# Patient Record
Sex: FEMALE | Race: WHITE | NOT HISPANIC OR LATINO | Employment: FULL TIME | URBAN - METROPOLITAN AREA
[De-identification: names, ages, dates, MRNs, and addresses within clinical notes are randomized per-mention and may not be internally consistent; named-entity substitution may affect disease eponyms.]

---

## 2019-06-11 LAB
CHOLEST SERPL-MCNC: 235 MG/DL
CHOLESTEROL (EXTERNAL): 235 MG/DL (ref 0–199)
GLUCOSE SERPL-MCNC: 97 MG/DL (ref 70–100)
HDLC SERPL-MCNC: 85 MG/DL
HDLC SERPL-MCNC: 85 MG/DL
LDL CHOLESTEROL CALCULATED (EXTERNAL): 131 MG/DL
LDLC SERPL CALC-MCNC: 131 MG/DL
NON HDL CHOLESTEROL (EXTERNAL): 150 MG/DL
NONHDLC SERPL-MCNC: NORMAL MG/DL
TRIGL SERPL-MCNC: 93 MG/DL
TRIGLYCERIDES (EXTERNAL): 93 MG/DL
TSH SERPL-ACNC: 2.5 MCU/ML

## 2019-06-28 DIAGNOSIS — I34.1 MVP (MITRAL VALVE PROLAPSE): Primary | ICD-10-CM

## 2019-06-28 DIAGNOSIS — I05.9 MITRAL VALVE DISEASE: ICD-10-CM

## 2019-08-06 ENCOUNTER — HOSPITAL ENCOUNTER (OUTPATIENT)
Dept: CARDIOLOGY | Facility: CLINIC | Age: 64
Discharge: HOME | End: 2019-08-06
Attending: INTERNAL MEDICINE | Admitting: INTERNAL MEDICINE
Payer: COMMERCIAL

## 2019-08-06 DIAGNOSIS — I05.9 MITRAL VALVE DISEASE: ICD-10-CM

## 2019-08-06 DIAGNOSIS — I34.1 MVP (MITRAL VALVE PROLAPSE): ICD-10-CM

## 2019-08-06 PROCEDURE — 93306 TTE W/DOPPLER COMPLETE: CPT | Mod: 26 | Performed by: INTERNAL MEDICINE

## 2019-08-06 PROCEDURE — 93306 TTE W/DOPPLER COMPLETE: CPT

## 2019-10-31 ENCOUNTER — PRE VISIT (OUTPATIENT)
Dept: CARDIOLOGY | Facility: CLINIC | Age: 64
End: 2019-10-31

## 2019-11-22 ENCOUNTER — OFFICE VISIT (OUTPATIENT)
Dept: CARDIOLOGY | Facility: CLINIC | Age: 64
End: 2019-11-22
Payer: COMMERCIAL

## 2019-11-22 VITALS
SYSTOLIC BLOOD PRESSURE: 123 MMHG | HEIGHT: 64 IN | WEIGHT: 112 LBS | HEART RATE: 80 BPM | BODY MASS INDEX: 19.12 KG/M2 | DIASTOLIC BLOOD PRESSURE: 62 MMHG

## 2019-11-22 DIAGNOSIS — E78.00 PURE HYPERCHOLESTEROLEMIA: ICD-10-CM

## 2019-11-22 DIAGNOSIS — I05.9 MITRAL VALVE DISEASE: ICD-10-CM

## 2019-11-22 DIAGNOSIS — I34.1 MVP (MITRAL VALVE PROLAPSE): Primary | ICD-10-CM

## 2019-11-22 PROCEDURE — 99213 OFFICE O/P EST LOW 20 MIN: CPT | Performed by: INTERNAL MEDICINE

## 2019-11-22 RX ORDER — CHOLECALCIFEROL (VITAMIN D3) 50 MCG
1 TABLET ORAL DAILY
COMMUNITY

## 2019-11-22 ASSESSMENT — MIFFLIN-ST. JEOR: SCORE: 1048.03

## 2019-11-22 NOTE — LETTER
11/22/2019    Kiana Byrd  [facility] [address] [place] [address]    RE: Charu Murillo       Dear Colleague,    I had the pleasure of seeing Charu Murillo in the [facility].    HPI and Plan:   See dictation    Orders Placed This Encounter   Procedures     Follow-Up with Cardiologist       Orders Placed This Encounter   Medications     Multiple Vitamins-Minerals (WOMENS MULTI PO)     Sig: Take by mouth daily     vitamin D3 (CHOLECALCIFEROL) 2000 units (50 mcg) tablet     Sig: Take 1 tablet by mouth daily       There are no discontinued medications.      Encounter Diagnoses   Name Primary?     MVP (mitral valve prolapse) Yes     Mitral valve disease      Pure hypercholesterolemia        CURRENT MEDICATIONS:  Current Outpatient Medications   Medication Sig Dispense Refill     Multiple Vitamins-Minerals (WOMENS MULTI PO) Take by mouth daily       traZODone (DESYREL) 50 MG tablet Take 50 mg by mouth At Bedtime       vitamin D3 (CHOLECALCIFEROL) 2000 units (50 mcg) tablet Take 1 tablet by mouth daily         ALLERGIES     Allergies   Allergen Reactions     Penicillins Anaphylaxis       PAST MEDICAL HISTORY:  Past Medical History:   Diagnosis Date     Family history of coronary artery disease      GERD (gastroesophageal reflux disease)      Hyperlipidemia      Insomnia      MVP (mitral valve prolapse)      Palpitations      Thyroid cyst        PAST SURGICAL HISTORY:  History reviewed. No pertinent surgical history.    FAMILY HISTORY:  Family History   Problem Relation Age of Onset     Myocardial Infarction Father 62        Massive @ 67       SOCIAL HISTORY:  Social History     Socioeconomic History     Marital status:      Spouse name: None     Number of children: None     Years of education: None     Highest education level: None   Occupational History     None   Social Needs     Financial resource strain: None     Food insecurity:     Worry: None      Inability: None     Transportation needs:     Medical: None     Non-medical: None   Tobacco Use     Smoking status: Never Smoker     Smokeless tobacco: Never Used   Substance and Sexual Activity     Alcohol use: Yes     Alcohol/week: 0.0 standard drinks     Comment: Wine      Drug use: None     Sexual activity: None   Lifestyle     Physical activity:     Days per week: None     Minutes per session: None     Stress: None   Relationships     Social connections:     Talks on phone: None     Gets together: None     Attends Sabianism service: None     Active member of club or organization: None     Attends meetings of clubs or organizations: None     Relationship status: None     Intimate partner violence:     Fear of current or ex partner: None     Emotionally abused: None     Physically abused: None     Forced sexual activity: None   Other Topics Concern     Parent/sibling w/ CABG, MI or angioplasty before 65F 55M? No      Service Not Asked     Blood Transfusions Not Asked     Caffeine Concern Not Asked     Occupational Exposure Not Asked     Hobby Hazards Not Asked     Sleep Concern Not Asked     Stress Concern Not Asked     Weight Concern Not Asked     Special Diet No     Back Care Not Asked     Exercise Yes     Comment: walking, biking, running, boot camp      Bike Helmet Not Asked     Seat Belt Not Asked     Self-Exams Not Asked   Social History Narrative     None       Review of Systems:  Skin:  Positive for   hx of skin cancer    Eyes:  Positive for glasses    ENT:  Positive for vertigo No recent spells  Respiratory:  Negative       Cardiovascular:    Positive for;palpitations rare  Gastroenterology: Negative      Genitourinary:  Negative urinary frequency    Musculoskeletal:  Positive for(new DX os osteoporosis)   herniated disk, left knee torn meniscus   Neurologic:  Negative      Psychiatric:  Positive for sleep disturbances    Heme/Lymph/Imm:  Positive for allergies    Endocrine:  Negative     "    Physical Exam:  Vitals: /62   Pulse 80   Ht 1.626 m (5' 4\")   Wt 50.8 kg (112 lb)   BMI 19.22 kg/m       Constitutional:  cooperative, alert and oriented, well developed, well nourished, in no acute distress thin      Skin:  warm and dry to the touch, no apparent skin lesions or masses noted          Head:  normocephalic, no masses or lesions        Eyes:  pupils equal and round, conjunctivae and lids unremarkable, sclera white, no xanthalasma, EOMS intact, no nystagmus        Lymph:      ENT:  no pallor or cyanosis, dentition good        Neck:  carotid pulses are full and equal bilaterally;no carotid bruit        Respiratory:  normal breath sounds, clear to auscultation, normal A-P diameter, normal symmetry, normal respiratory excursion, no use of accessory muscles         Cardiac: regular rhythm;normal S1 and S2;no S3 or S4       holosystolic murmur;apical;grade 2;radiation to the axilla        pulses full and equal                                        GI:           Extremities and Muscular Skeletal:  no edema;no spinal abnormalities noted;normal muscle strength and tone              Neurological:  no gross motor deficits        Psych:  affect appropriate, oriented to time, person and place        CC  Provider Not In System                   Thank you for allowing me to participate in the care of your patient.      Sincerely,     Yvon Alexandre MD     [facility]    cc:   Provider Not In System           "

## 2019-11-22 NOTE — PROGRESS NOTES
Service Date: 11/22/2019      HISTORY OF PRESENT ILLNESS:  Ms. Murillo is a very nice 64-year-old woman with a past medical history significant for mitral valve prolapse, mild mitral regurgitation, palpitations, marked hypercholesterolemia with a very high HDL and a family history of coronary artery disease.  Fortunately, her calcium score was 0 in September of this year.      Charu returns to clinic stating she is feeling great.  She has no chest, arm, neck, jaw or shoulder discomfort.  No dyspnea on exertion, orthopnea or PND.  No palpitations, lightheadedness, dizziness, syncope or near-syncope.      Charu states she exercises probably about 4 times a week doing a combination of aerobic activity and resistance activity.  She tries to follow a healthy diet and maintains a body mass index of 19.      Her concern at this time is she has been told that she has osteoporosis and it has been recommended that she go on medications, and she is quite concerned about this.  She likes to try to do everything the natural way.  She does take a vitamin D supplement and asks about drinking milk.      ASSESSMENT AND PLAN:  Charu appears to be doing quite well from a cardiac standpoint without clinical evidence of ischemia, heart failure or significant arrhythmia.      We reviewed her calcium score of 0 and the fact that this is as low risk as she can be.      Blood pressure is very well controlled at 123/62 with a pulse of 80.  As stated, her weight is 112 pounds with clothes and boots on giving her a body mass index of 19, so it is obviously even lower than that.  Fasting lipid profile was checked earlier this year in June.  Total cholesterol is 235, HDL is 85, LDL is 131, triglycerides are 93.  We talked about the parameters, and obviously her HDL is quite high, LDL and cholesterol are a bit high for someone without known coronary artery disease, but yet I would not start aspirin or I would not start cholesterol.      We did  "do a followup echocardiogram.  Last echo was 3 years ago.  Echo today describes only trace mitral regurgitation and mild tricuspid regurgitation.  She does have mild mitral valve prolapse.  We have been following this every 3 years.  I told her I do not think this is even necessary, that she is not likely to progress, although she states she is anxious and would like to make sure we keep an eye on it, so ultimately we decided we will just check it again in 3 years and we will visit again.      I congratulated her on her healthy lifestyle.      I have told her my limited information on bone supplements or medications for bone metabolism are somewhat gray, that they increase calcium deposition but may not necessarily decrease bone fractures.  I told her the most important thing is regular exercise including resistance activity, which she is doing, a diet high in natural sources of calcium and taking a vitamin D supplement.  I have told her she should discuss this further with her primary care doctor.  I have also suggested that she read Evelia's book, \"How Not to Die,\" especially since she is also concerned about a diet to prevent lung cancer, which does run in her family, and I referred her to look on the Internet at reliable sources.      She asks about my wife, who is a holistic health , and I gave her a card if she wants a review of lifestyle changes, although it sounds like she is doing everything appropriately as we speak.  As stated, I will follow her up in 3 years.  If she should have any problems, I would be glad to see her sooner.         ERI BATRES MD, [facility]             D: 2019   T: 2019   MT: ALESIA      Name:     RODGER LOZANO   MRN:      [id number]        Account:      [id number]   :      1955           Service Date: 2019      Document: [id number]    "

## 2019-11-22 NOTE — PROGRESS NOTES
HPI and Plan:   See dictation    Orders Placed This Encounter   Procedures     Follow-Up with Cardiologist       Orders Placed This Encounter   Medications     Multiple Vitamins-Minerals (WOMENS MULTI PO)     Sig: Take by mouth daily     vitamin D3 (CHOLECALCIFEROL) 2000 units (50 mcg) tablet     Sig: Take 1 tablet by mouth daily       There are no discontinued medications.      Encounter Diagnoses   Name Primary?     MVP (mitral valve prolapse) Yes     Mitral valve disease      Pure hypercholesterolemia        CURRENT MEDICATIONS:  Current Outpatient Medications   Medication Sig Dispense Refill     Multiple Vitamins-Minerals (WOMENS MULTI PO) Take by mouth daily       traZODone (DESYREL) 50 MG tablet Take 50 mg by mouth At Bedtime       vitamin D3 (CHOLECALCIFEROL) 2000 units (50 mcg) tablet Take 1 tablet by mouth daily         ALLERGIES     Allergies   Allergen Reactions     Penicillins Anaphylaxis       PAST MEDICAL HISTORY:  Past Medical History:   Diagnosis Date     Family history of coronary artery disease      GERD (gastroesophageal reflux disease)      Hyperlipidemia      Insomnia      MVP (mitral valve prolapse)      Palpitations      Thyroid cyst        PAST SURGICAL HISTORY:  History reviewed. No pertinent surgical history.    FAMILY HISTORY:  Family History   Problem Relation Age of Onset     Myocardial Infarction Father 62        Massive @ 67       SOCIAL HISTORY:  Social History     Socioeconomic History     Marital status:      Spouse name: None     Number of children: None     Years of education: None     Highest education level: None   Occupational History     None   Social Needs     Financial resource strain: None     Food insecurity:     Worry: None     Inability: None     Transportation needs:     Medical: None     Non-medical: None   Tobacco Use     Smoking status: Never Smoker     Smokeless tobacco: Never Used   Substance and Sexual Activity     Alcohol use: Yes     Alcohol/week: 0.0  "standard drinks     Comment: Wine      Drug use: None     Sexual activity: None   Lifestyle     Physical activity:     Days per week: None     Minutes per session: None     Stress: None   Relationships     Social connections:     Talks on phone: None     Gets together: None     Attends [employer] service: None     Active member of club or organization: None     Attends meetings of clubs or organizations: None     Relationship status: None     Intimate partner violence:     Fear of current or ex partner: None     Emotionally abused: None     Physically abused: None     Forced sexual activity: None   Other Topics Concern     Parent/sibling w/ CABG, MI or angioplasty before 65F 55M? No      Service Not Asked     Blood Transfusions Not Asked     Caffeine Concern Not Asked     Occupational Exposure Not Asked     Hobby Hazards Not Asked     Sleep Concern Not Asked     Stress Concern Not Asked     Weight Concern Not Asked     Special Diet No     Back Care Not Asked     Exercise Yes     Comment: walking, biking, running, boot camp      Bike Helmet Not Asked     Seat Belt Not Asked     Self-Exams Not Asked   Social History Narrative     None       Review of Systems:  Skin:  Positive for   hx of skin cancer    Eyes:  Positive for glasses    ENT:  Positive for vertigo No recent spells  Respiratory:  Negative       Cardiovascular:    Positive for;palpitations rare  Gastroenterology: Negative      Genitourinary:  Negative urinary frequency    Musculoskeletal:  Positive for(new DX os osteoporosis)   herniated disk, left knee torn meniscus   Neurologic:  Negative      Psychiatric:  Positive for sleep disturbances    Heme/Lymph/Imm:  Positive for allergies    Endocrine:  Negative        Physical Exam:  Vitals: /62   Pulse 80   Ht 1.626 m (5' 4\")   Wt 50.8 kg (112 lb)   BMI 19.22 kg/m      Constitutional:  cooperative, alert and oriented, well developed, well nourished, in no acute distress thin      Skin:  warm and " dry to the touch, no apparent skin lesions or masses noted          Head:  normocephalic, no masses or lesions        Eyes:  pupils equal and round, conjunctivae and lids unremarkable, sclera white, no xanthalasma, EOMS intact, no nystagmus        Lymph:      ENT:  no pallor or cyanosis, dentition good        Neck:  carotid pulses are full and equal bilaterally;no carotid bruit        Respiratory:  normal breath sounds, clear to auscultation, normal A-P diameter, normal symmetry, normal respiratory excursion, no use of accessory muscles         Cardiac: regular rhythm;normal S1 and S2;no S3 or S4       holosystolic murmur;apical;grade 2;radiation to the axilla        pulses full and equal                                        GI:           Extremities and Muscular Skeletal:  no edema;no spinal abnormalities noted;normal muscle strength and tone              Neurological:  no gross motor deficits        Psych:  affect appropriate, oriented to time, person and place        CC  Provider Not In System

## 2019-11-22 NOTE — LETTER
11/22/2019      Kiana Byrd  [facility] [address] [place] [address]      RE: Charu Murillo       Dear Colleague,    I had the pleasure of seeing Charu Murillo in the [facility].    Service Date: 11/22/2019      HISTORY OF PRESENT ILLNESS:  Ms. Murillo is a very nice 64-year-old woman with a past medical history significant for mitral valve prolapse, mild mitral regurgitation, palpitations, marked hypercholesterolemia with a very high HDL and a family history of coronary artery disease.  Fortunately, her calcium score was 0 in September of this year.      Charu returns to clinic stating she is feeling great.  She has no chest, arm, neck, jaw or shoulder discomfort.  No dyspnea on exertion, orthopnea or PND.  No palpitations, lightheadedness, dizziness, syncope or near-syncope.      Charu states she exercises probably about 4 times a week doing a combination of aerobic activity and resistance activity.  She tries to follow a healthy diet and maintains a body mass index of 19.      Her concern at this time is she has been told that she has osteoporosis and it has been recommended that she go on medications, and she is quite concerned about this.  She likes to try to do everything the natural way.  She does take a vitamin D supplement and asks about drinking milk.      ASSESSMENT AND PLAN:  Charu appears to be doing quite well from a cardiac standpoint without clinical evidence of ischemia, heart failure or significant arrhythmia.      We reviewed her calcium score of 0 and the fact that this is as low risk as she can be.      Blood pressure is very well controlled at 123/62 with a pulse of 80.  As stated, her weight is 112 pounds with clothes and boots on giving her a body mass index of 19, so it is obviously even lower than that.  Fasting lipid profile was checked earlier this year in June.  Total cholesterol is 235, HDL is 85, LDL is 131,  "triglycerides are 93.  We talked about the parameters, and obviously her HDL is quite high, LDL and cholesterol are a bit high for someone without known coronary artery disease, but yet I would not start aspirin or I would not start cholesterol.      We did do a followup echocardiogram.  Last echo was 3 years ago.  Echo today describes only trace mitral regurgitation and mild tricuspid regurgitation.  She does have mild mitral valve prolapse.  We have been following this every 3 years.  I told her I do not think this is even necessary, that she is not likely to progress, although she states she is anxious and would like to make sure we keep an eye on it, so ultimately we decided we will just check it again in 3 years and we will visit again.      I congratulated her on her healthy lifestyle.      I have told her my limited information on bone supplements or medications for bone metabolism are somewhat gray, that they increase calcium deposition but may not necessarily decrease bone fractures.  I told her the most important thing is regular exercise including resistance activity, which she is doing, a diet high in natural sources of calcium and taking a vitamin D supplement.  I have told her she should discuss this further with her primary care doctor.  I have also suggested that she read Evelia's book, \"How Not to Die,\" especially since she is also concerned about a diet to prevent lung cancer, which does run in her family, and I referred her to look on the Internet at reliable sources.      She asks about my wife, who is a holistic health , and I gave her a card if she wants a review of lifestyle changes, although it sounds like she is doing everything appropriately as we speak.  As stated, I will follow her up in 3 years.  If she should have any problems, I would be glad to see her sooner.         ERI BATRES MD, FACC             D: 11/22/2019   T: 11/22/2019   MT: ALESIA      Name:     RODGER LOZANO "   MRN:      [id number]        Account:      [id number]   :      1955           Service Date: 2019      Document: [id number]         Outpatient Encounter Medications as of 2019   Medication Sig Dispense Refill     Multiple Vitamins-Minerals (WOMENS MULTI PO) Take by mouth daily       traZODone (DESYREL) 50 MG tablet Take 50 mg by mouth At Bedtime       vitamin D3 (CHOLECALCIFEROL) 2000 units (50 mcg) tablet Take 1 tablet by mouth daily       No facility-administered encounter medications on file as of 2019.        Again, thank you for allowing me to participate in the care of your patient.      Sincerely,    Yvon Alexandre MD     [facility]

## 2020-03-01 ENCOUNTER — HEALTH MAINTENANCE LETTER (OUTPATIENT)
Age: 65
End: 2020-03-01

## 2020-08-13 LAB
CHOLESTEROL (EXTERNAL): 251 MG/DL (ref 0–199)
HDLC SERPL-MCNC: 102 MG/DL
LDL CHOLESTEROL CALCULATED (EXTERNAL): 137 MG/DL
NON HDL CHOLESTEROL (EXTERNAL): 149 MG/DL
TRIGLYCERIDES (EXTERNAL): 62 MG/DL

## 2020-12-13 ENCOUNTER — HEALTH MAINTENANCE LETTER (OUTPATIENT)
Age: 65
End: 2020-12-13

## 2021-04-17 ENCOUNTER — HEALTH MAINTENANCE LETTER (OUTPATIENT)
Age: 66
End: 2021-04-17

## 2021-10-02 ENCOUNTER — HEALTH MAINTENANCE LETTER (OUTPATIENT)
Age: 66
End: 2021-10-02

## 2021-11-27 ENCOUNTER — HEALTH MAINTENANCE LETTER (OUTPATIENT)
Age: 66
End: 2021-11-27

## 2022-01-16 ENCOUNTER — HEALTH MAINTENANCE LETTER (OUTPATIENT)
Age: 67
End: 2022-01-16

## 2022-08-17 DIAGNOSIS — I34.1 MVP (MITRAL VALVE PROLAPSE): Primary | ICD-10-CM

## 2022-08-17 DIAGNOSIS — I34.1 MVP (MITRAL VALVE PROLAPSE): ICD-10-CM

## 2022-09-14 ENCOUNTER — HOSPITAL ENCOUNTER (OUTPATIENT)
Dept: CARDIOLOGY | Facility: CLINIC | Age: 67
Discharge: HOME | End: 2022-09-14
Attending: INTERNAL MEDICINE | Admitting: INTERNAL MEDICINE
Payer: MEDICARE

## 2022-09-14 DIAGNOSIS — I05.9 MITRAL VALVE DISEASE: ICD-10-CM

## 2022-09-14 LAB — LVEF ECHO: NORMAL

## 2022-09-14 PROCEDURE — 93306 TTE W/DOPPLER COMPLETE: CPT | Mod: 26 | Performed by: INTERNAL MEDICINE

## 2022-09-14 PROCEDURE — 93306 TTE W/DOPPLER COMPLETE: CPT

## 2022-09-23 ENCOUNTER — TRANSFERRED RECORDS (OUTPATIENT)
Dept: HEALTH INFORMATION MANAGEMENT | Facility: CLINIC | Age: 67
End: 2022-09-23

## 2022-09-23 LAB
CHOLESTEROL (EXTERNAL): 263 MG/DL (ref 0–199)
GLUCOSE (EXTERNAL): 85 MG/DL (ref 70–100)
HDLC SERPL-MCNC: 93 MG/DL
LDL CHOLESTEROL CALCULATED (EXTERNAL): 157 MG/DL
NON HDL CHOLESTEROL (EXTERNAL): 170 MG/DL
TRIGLYCERIDES (EXTERNAL): 65 MG/DL

## 2023-01-10 ENCOUNTER — OFFICE VISIT (OUTPATIENT)
Dept: CARDIOLOGY | Facility: CLINIC | Age: 68
End: 2023-01-10
Payer: MEDICARE

## 2023-01-10 VITALS
WEIGHT: 105 LBS | DIASTOLIC BLOOD PRESSURE: 80 MMHG | HEART RATE: 83 BPM | HEIGHT: 63 IN | SYSTOLIC BLOOD PRESSURE: 137 MMHG | BODY MASS INDEX: 18.61 KG/M2

## 2023-01-10 DIAGNOSIS — Z82.49 FAMILY HISTORY OF CORONARY ARTERY DISEASE: ICD-10-CM

## 2023-01-10 DIAGNOSIS — I34.0 NONRHEUMATIC MITRAL VALVE REGURGITATION: ICD-10-CM

## 2023-01-10 DIAGNOSIS — I34.1 MVP (MITRAL VALVE PROLAPSE): Primary | ICD-10-CM

## 2023-01-10 DIAGNOSIS — E78.00 PURE HYPERCHOLESTEROLEMIA: ICD-10-CM

## 2023-01-10 PROCEDURE — 99203 OFFICE O/P NEW LOW 30 MIN: CPT | Performed by: INTERNAL MEDICINE

## 2023-01-10 NOTE — PROGRESS NOTES
HPI and Plan:   See dictation    Today's clinic visit entailed:  Review of the result(s) of each unique test - Echocardiogram, lab work  Ordering of each unique test  Prescription drug management  30 minutes spent on the date of the encounter doing chart review, history and exam, documentation and further activities per the note  Provider  Link to [facility] Help Grid           Orders Placed This Encounter   Procedures     Follow-Up with Cardiologist       No orders of the defined types were placed in this encounter.      There are no discontinued medications.      Encounter Diagnoses   Name Primary?     MVP (mitral valve prolapse) Yes     Nonrheumatic mitral valve regurgitation      Pure hypercholesterolemia      Family history of coronary artery disease        CURRENT MEDICATIONS:  Current Outpatient Medications   Medication Sig Dispense Refill     Multiple Vitamins-Minerals (WOMENS MULTI PO) Take by mouth daily       traZODone (DESYREL) 50 MG tablet Take 50 mg by mouth At Bedtime       vitamin D3 (CHOLECALCIFEROL) 2000 units (50 mcg) tablet Take 1 tablet by mouth daily         ALLERGIES     Allergies   Allergen Reactions     Penicillins Anaphylaxis       PAST MEDICAL HISTORY:  Past Medical History:   Diagnosis Date     Family history of coronary artery disease      GERD (gastroesophageal reflux disease)      Hyperlipidemia      Insomnia      MVP (mitral valve prolapse)      Palpitations      Thyroid cyst        PAST SURGICAL HISTORY:  No past surgical history on file.    FAMILY HISTORY:  Family History   Problem Relation Age of Onset     Myocardial Infarction Father 62        Massive @ 67       SOCIAL HISTORY:  Social History     Socioeconomic History     Marital status:      Spouse name: None     Number of children: None     Years of education: None     Highest education level: None   Tobacco Use     Smoking status: Never     Smokeless tobacco: Never   Substance and Sexual Activity     Alcohol use: Yes      "Alcohol/week: 0.0 standard drinks     Comment: Wine    Other Topics Concern     Parent/sibling w/ CABG, MI or angioplasty before 65F 55M? No     Special Diet No     Exercise Yes     Comment: walking, biking, running, boot camp        Review of Systems:  Skin:  not assessed       Eyes:  not assessed      ENT:  not assessed      Respiratory:  Negative       Cardiovascular:    Positive for;palpitations Pt claims seldom palpitations, with no increase in length or frequency  Gastroenterology: not assessed      Genitourinary:  not assessed      Musculoskeletal:  not assessed      Neurologic:  not assessed      Psychiatric:  not assessed      Heme/Lymph/Imm:  not assessed      Endocrine:  Negative        Physical Exam:  Vitals: /80   Pulse 83   Ht 1.6 m (5' 3\")   Wt 47.6 kg (105 lb)   BMI 18.60 kg/m      Constitutional:  cooperative, alert and oriented, well developed, well nourished, in no acute distress thin      Skin:  warm and dry to the touch, no apparent skin lesions or masses noted          Head:  normocephalic, no masses or lesions        Eyes:  pupils equal and round, conjunctivae and lids unremarkable, sclera white, no xanthalasma, EOMS intact, no nystagmus        Lymph:      ENT:  no pallor or cyanosis, dentition good        Neck:  carotid pulses are full and equal bilaterally;no carotid bruit        Respiratory:  normal breath sounds, clear to auscultation, normal A-P diameter, normal symmetry, normal respiratory excursion, no use of accessory muscles         Cardiac: regular rhythm;normal S1 and S2;no S3 or S4       holosystolic murmur;apical;radiation to the axilla;grade 1        pulses full and equal                                        GI:           Extremities and Muscular Skeletal:  no edema;no spinal abnormalities noted;normal muscle strength and tone              Neurological:  no gross motor deficits        Psych:  affect appropriate, oriented to time, person and place        BOB AGOSTO" MD [facility]  [address]  [place] [id number]

## 2023-01-10 NOTE — LETTER
1/10/2023    Kiana M Rochelle  [address] [facility] [facility] [address]    RE: Charu Murillo       Dear Colleague,     I had the pleasure of seeing Charu Murillo in the [facility].  HPI and Plan:   See dictation    Today's clinic visit entailed:  Review of the result(s) of each unique test - Echocardiogram, lab work  Ordering of each unique test  Prescription drug management  30 minutes spent on the date of the encounter doing chart review, history and exam, documentation and further activities per the note  Provider  Link to [facility] Help Grid           Orders Placed This Encounter   Procedures     Follow-Up with Cardiologist       No orders of the defined types were placed in this encounter.      There are no discontinued medications.      Encounter Diagnoses   Name Primary?     MVP (mitral valve prolapse) Yes     Nonrheumatic mitral valve regurgitation      Pure hypercholesterolemia      Family history of coronary artery disease        CURRENT MEDICATIONS:  Current Outpatient Medications   Medication Sig Dispense Refill     Multiple Vitamins-Minerals (WOMENS MULTI PO) Take by mouth daily       traZODone (DESYREL) 50 MG tablet Take 50 mg by mouth At Bedtime       vitamin D3 (CHOLECALCIFEROL) 2000 units (50 mcg) tablet Take 1 tablet by mouth daily         ALLERGIES     Allergies   Allergen Reactions     Penicillins Anaphylaxis       PAST MEDICAL HISTORY:  Past Medical History:   Diagnosis Date     Family history of coronary artery disease      GERD (gastroesophageal reflux disease)      Hyperlipidemia      Insomnia      MVP (mitral valve prolapse)      Palpitations      Thyroid cyst        PAST SURGICAL HISTORY:  No past surgical history on file.    FAMILY HISTORY:  Family History   Problem Relation Age of Onset     Myocardial Infarction Father 62        Massive @ 67       SOCIAL HISTORY:  Social History     Socioeconomic History     Marital status:      Spouse name: None      "Number of children: None     Years of education: None     Highest education level: None   Tobacco Use     Smoking status: Never     Smokeless tobacco: Never   Substance and Sexual Activity     Alcohol use: Yes     Alcohol/week: 0.0 standard drinks     Comment: Wine    Other Topics Concern     Parent/sibling w/ CABG, MI or angioplasty before 65F 55M? No     Special Diet No     Exercise Yes     Comment: walking, biking, running, boot camp        Review of Systems:  Skin:  not assessed       Eyes:  not assessed      ENT:  not assessed      Respiratory:  Negative       Cardiovascular:    Positive for;palpitations Pt claims seldom palpitations, with no increase in length or frequency  Gastroenterology: not assessed      Genitourinary:  not assessed      Musculoskeletal:  not assessed      Neurologic:  not assessed      Psychiatric:  not assessed      Heme/Lymph/Imm:  not assessed      Endocrine:  Negative        Physical Exam:  Vitals: /80   Pulse 83   Ht 1.6 m (5' 3\")   Wt 47.6 kg (105 lb)   BMI 18.60 kg/m      Constitutional:  cooperative, alert and oriented, well developed, well nourished, in no acute distress thin      Skin:  warm and dry to the touch, no apparent skin lesions or masses noted          Head:  normocephalic, no masses or lesions        Eyes:  pupils equal and round, conjunctivae and lids unremarkable, sclera white, no xanthalasma, EOMS intact, no nystagmus        Lymph:      ENT:  no pallor or cyanosis, dentition good        Neck:  carotid pulses are full and equal bilaterally;no carotid bruit        Respiratory:  normal breath sounds, clear to auscultation, normal A-P diameter, normal symmetry, normal respiratory excursion, no use of accessory muscles         Cardiac: regular rhythm;normal S1 and S2;no S3 or S4       holosystolic murmur;apical;radiation to the axilla;grade 1        pulses full and equal                                        GI:           Extremities and Muscular Skeletal:  " no edema;no spinal abnormalities noted;normal muscle strength and tone              Neurological:  no gross motor deficits        Psych:  affect appropriate, oriented to time, person and place        CC  Yvon Alexandre MD  [address]  [place] [id number]                Service Date: 01/10/2023    CLINIC VISIT    HISTORY OF PRESENT ILLNESS:  Charu is a very nice 67-year-old woman who I have seen intermittently over the last several years because of her history of mitral valve prolapse, mild mitral regurgitation, palpitations, marked hypercholesterolemia with a very high HDL, a family history of coronary artery disease on her father's side.  Fortunately, her calcium score was 0 in 09/2019.    Charu returns to clinic stating she is feeling great.  She has no chest, arm, neck, jaw or shoulder discomfort.  No dyspnea on exertion, orthopnea or PND.  No palpitations, lightheadedness, dizziness, syncope or near syncope.  She is doing aerobic activity and yoga and she states she thinks she gets a fair amount of resistance activity in her yoga.  None of this causes her any problems.    She also follows a very healthy diet.  She had lost weight and had a body mass index of lower than 18 and her primary care doctor told her to liberalize her diet and she has gained some weight back.  She has also been told she has osteoporosis and has been encouraged to go on medications, although she has resisted this.  She states she was supposed to get her bone density checked again this month but it got canceled and she is not pursuing it.  She does take a vitamin D supplement.    ASSESSMENT AND PLAN:  Charu appears to be doing well from a cardiac standpoint without clinical evidence of ischemia, heart failure or significant arrhythmia.    As stated, her calcium score is 0, so she is very low risk.  She does not need to be on aspirin and does not need to be on any cholesterol medications.    Blood pressure is adequately  controlled at 137/80 and she states it is normally even lower than that.  Pulse is 83.  Weight is 105 pounds, giving her a body mass index of 18.6.    Fasting lipid profile from September shows a total cholesterol of 263, HDL of 93, LDL of 157 and triglycerides of 65.  I plugged her numbers into the ACC risk score and she comes back at 6.6, so aspirin and cholesterol medicine does not appear to be indicated, especially if you throw in a calcium score of 0.    We did repeat her echocardiogram again.  She has bileaflet mitral valve prolapse with a mild amount of mitral regurgitation.  Ejection fraction is 60%-65%.  Pulmonary pressures are estimated to be normal at 17 mmHg plus right atrial pressure.  I have recommended that we repeat her echocardiogram in 2 or 3 years.  We talked about the fact that it is not likely to progress.  If it were to progress suddenly, that she would get endocarditis.  She probably would be quite symptomatic.  We reviewed the fact that she does not need antibiotic prophylaxis for dental procedures.    I congratulated her on a very healthy lifestyle.    We did talk about her osteoporosis.  I have told her I would defer her to her primary care doctor but that she probably should have her bone density done, as she is high risk for osteoporosis given her small size.    Yvon Alexandre MD, [facility]        D: 01/10/2023   T: 01/10/2023   MT: lulu    Name:     RODGER LOZANO  MRN:      [id number]        Account:      [id number]   :      1955           Service Date: 01/10/2023       Document: [id number]      Thank you for allowing me to participate in the care of your patient.      Sincerely,     Yvon Alexandre MD     [facility]  cc:   Yvon Alexandre MD  [address]  [place] [id number]

## 2023-01-10 NOTE — PROGRESS NOTES
Service Date: 01/10/2023    CLINIC VISIT    HISTORY OF PRESENT ILLNESS:  Charu is a very nice 67-year-old woman who I have seen intermittently over the last several years because of her history of mitral valve prolapse, mild mitral regurgitation, palpitations, marked hypercholesterolemia with a very high HDL, a family history of coronary artery disease on her father's side.  Fortunately, her calcium score was 0 in 09/2019.    Charu returns to clinic stating she is feeling great.  She has no chest, arm, neck, jaw or shoulder discomfort.  No dyspnea on exertion, orthopnea or PND.  No palpitations, lightheadedness, dizziness, syncope or near syncope.  She is doing aerobic activity and yoga and she states she thinks she gets a fair amount of resistance activity in her yoga.  None of this causes her any problems.    She also follows a very healthy diet.  She had lost weight and had a body mass index of lower than 18 and her primary care doctor told her to liberalize her diet and she has gained some weight back.  She has also been told she has osteoporosis and has been encouraged to go on medications, although she has resisted this.  She states she was supposed to get her bone density checked again this month but it got canceled and she is not pursuing it.  She does take a vitamin D supplement.    ASSESSMENT AND PLAN:  Charu appears to be doing well from a cardiac standpoint without clinical evidence of ischemia, heart failure or significant arrhythmia.    As stated, her calcium score is 0, so she is very low risk.  She does not need to be on aspirin and does not need to be on any cholesterol medications.    Blood pressure is adequately controlled at 137/80 and she states it is normally even lower than that.  Pulse is 83.  Weight is 105 pounds, giving her a body mass index of 18.6.    Fasting lipid profile from September shows a total cholesterol of 263, HDL of 93, LDL of 157 and triglycerides of 65.  I plugged her  numbers into the ACC risk score and she comes back at 6.6, so aspirin and cholesterol medicine does not appear to be indicated, especially if you throw in a calcium score of 0.    We did repeat her echocardiogram again.  She has bileaflet mitral valve prolapse with a mild amount of mitral regurgitation.  Ejection fraction is 60%-65%.  Pulmonary pressures are estimated to be normal at 17 mmHg plus right atrial pressure.  I have recommended that we repeat her echocardiogram in 2 or 3 years.  We talked about the fact that it is not likely to progress.  If it were to progress suddenly, that she would get endocarditis.  She probably would be quite symptomatic.  We reviewed the fact that she does not need antibiotic prophylaxis for dental procedures.    I congratulated her on a very healthy lifestyle.    We did talk about her osteoporosis.  I have told her I would defer her to her primary care doctor but that she probably should have her bone density done, as she is high risk for osteoporosis given her small size.    Yvon Alexandre MD, [facility]        D: 01/10/2023   T: 01/10/2023   MT: lulu    Name:     RODGER LOZANO  MRN:      [id number]        Account:      [id number]   :      1955           Service Date: 01/10/2023       Document: [id number]

## 2023-12-03 ENCOUNTER — HEALTH MAINTENANCE LETTER (OUTPATIENT)
Age: 68
End: 2023-12-03

## 2023-12-03 ENCOUNTER — MYC MEDICAL ADVICE (OUTPATIENT)
Dept: CARDIOLOGY | Facility: CLINIC | Age: 68
End: 2023-12-03
Payer: MEDICARE

## 2025-01-05 ENCOUNTER — HEALTH MAINTENANCE LETTER (OUTPATIENT)
Age: 70
End: 2025-01-05

## 2025-02-08 ENCOUNTER — HEALTH MAINTENANCE LETTER (OUTPATIENT)
Age: 70
End: 2025-02-08